# Patient Record
Sex: MALE | Race: WHITE | NOT HISPANIC OR LATINO | Employment: OTHER | URBAN - NONMETROPOLITAN AREA
[De-identification: names, ages, dates, MRNs, and addresses within clinical notes are randomized per-mention and may not be internally consistent; named-entity substitution may affect disease eponyms.]

---

## 2024-07-18 ENCOUNTER — OFFICE VISIT (OUTPATIENT)
Dept: NEUROSURGERY | Facility: CLINIC | Age: 84
End: 2024-07-18
Payer: MEDICARE

## 2024-07-18 VITALS — BODY MASS INDEX: 23.33 KG/M2 | HEIGHT: 74 IN | WEIGHT: 181.8 LBS

## 2024-07-18 DIAGNOSIS — M51.36 DDD (DEGENERATIVE DISC DISEASE), LUMBAR: Primary | ICD-10-CM

## 2024-07-18 DIAGNOSIS — F17.200 SMOKER: ICD-10-CM

## 2024-07-18 PROBLEM — M51.369 DDD (DEGENERATIVE DISC DISEASE), LUMBAR: Status: ACTIVE | Noted: 2024-07-18

## 2024-07-18 RX ORDER — MELATONIN
1000 DAILY
COMMUNITY

## 2024-07-18 RX ORDER — ACETAMINOPHEN 500 MG
500 TABLET ORAL EVERY 6 HOURS PRN
COMMUNITY

## 2024-07-18 RX ORDER — LORATADINE 10 MG/1
10 TABLET ORAL DAILY
COMMUNITY

## 2024-07-18 RX ORDER — SILDENAFIL 100 MG/1
100 TABLET, FILM COATED ORAL DAILY
COMMUNITY
Start: 2024-06-11

## 2024-07-18 RX ORDER — LOSARTAN POTASSIUM 25 MG/1
25 TABLET ORAL DAILY
COMMUNITY
Start: 2024-05-02

## 2024-07-18 RX ORDER — TADALAFIL 5 MG/1
1 TABLET ORAL DAILY
COMMUNITY
Start: 2024-05-25

## 2024-07-18 RX ORDER — GABAPENTIN 300 MG/1
300 CAPSULE ORAL NIGHTLY
Qty: 30 CAPSULE | Refills: 1 | Status: SHIPPED | OUTPATIENT
Start: 2024-07-18

## 2024-07-18 RX ORDER — CLOBETASOL PROPIONATE 0.5 MG/G
CREAM TOPICAL DAILY
COMMUNITY
Start: 2024-05-02

## 2024-07-18 RX ORDER — TAMSULOSIN HYDROCHLORIDE 0.4 MG/1
1 CAPSULE ORAL DAILY
COMMUNITY

## 2024-07-18 RX ORDER — GABAPENTIN 100 MG/1
100 CAPSULE ORAL 2 TIMES DAILY
Qty: 60 CAPSULE | Refills: 1 | Status: SHIPPED | OUTPATIENT
Start: 2024-07-18

## 2024-07-18 NOTE — PATIENT INSTRUCTIONS
Follow up with Dr Haider after PT  Call for sooner appointment if pain worsens, or if you develop and focal weakness  Gabapentin has been sent to the pharmacy

## 2024-07-18 NOTE — PROGRESS NOTES
Chief complaint:   Chief Complaint   Patient presents with    Back Pain     LUMBAR SPONDYLOSIS- pt states he is having lower back, and bilaterla hip pain, stated he has had PT in fall of 2023. Previously has seen Dr. Singh in the past for injections last one was over a year ago. Was seeing a provider at [facility] and discussed surgery.       Subjective     HPI: Too comes in today with his wife with complaints of chronic and worsening low back pain.  He has had issues for 10 to 12 years.  It is all axial back pain.  He has no leg pain.  His pain is most pronounced with flexion and when he rises from a sitting to standing position, he has a sharp pain with the first 2-3 steps.  He states that it takes him about 45 minutes to be able to ambulate in the morning due to stiffness.  He had a full workup earlier this year at [facility].  No surgery was recommended.  He denies any focal weakness as well as paresthesias.  He states he can stand up to an hour before he has to sit down.  He did pain management injections with Dr. Singh 3 to 4 years ago.  He had epidurals and SI joint injections.  Facet rhizotomies were recommended but after talking to his friends, he elected not to proceed with the procedure. He is currently utilizing Tylenol arthritis for pain.  He has been on gabapentin in the past but feels the dose was too high and it was making him loopy.  He indicates he would like to try it again.  He has not had any physical therapy recently.    Past medical history is significant for hypertension, BPH and seasonal allergies.    He is a retired .  He is .  He is a smoker.  He does not utilize drugs or alcohol.    Review of Systems   Constitutional:  Positive for activity change and fatigue.   Musculoskeletal:  Positive for back pain.        Past Medical History:   Diagnosis Date    Arthritis      History reviewed. No pertinent surgical history.  History reviewed. No  yes "pertinent family history.  Social History     Tobacco Use    Smoking status: Every Day     Types: Cigarettes     Passive exposure: Current    Smokeless tobacco: Never   Vaping Use    Vaping status: Never Used     (Not in a hospital admission)    Allergies:  Amoxicillin-pot clavulanate, Clavulanic acid, Doxycycline, and Gabapentin    Objective      Vital Signs  Ht 188 cm (74\")   Wt 82.5 kg (181 lb 12.8 oz)   BMI 23.34 kg/m²     Physical Exam  Constitutional:       Appearance: Normal appearance. He is well-developed.   HENT:      Head: Normocephalic.   Eyes:      General: Lids are normal.      Conjunctiva/sclera: Conjunctivae normal.      Pupils: Pupils are equal, round, and reactive to light.   Pulmonary:      Effort: Pulmonary effort is normal.      Breath sounds: Normal breath sounds.   Musculoskeletal:         General: Tenderness present.      Cervical back: Normal range of motion. Tenderness: There is pain with lumbar flexion extension and left greater than right lateral rotation..   Skin:     General: Skin is warm and dry.   Neurological:      Mental Status: He is alert and oriented to person, place, and time.      GCS: GCS eye subscore is 4. GCS verbal subscore is 5. GCS motor subscore is 6.      Cranial Nerves: No cranial nerve deficit.      Sensory: No sensory deficit.      Motor: Motor strength is normal.No weakness.      Coordination: Coordination normal.      Gait: Gait normal.      Deep Tendon Reflexes: Reflexes are normal and symmetric. Reflexes normal.      Reflex Scores:       Tricep reflexes are 2+ on the right side and 2+ on the left side.       Bicep reflexes are 2+ on the right side and 2+ on the left side.       Brachioradialis reflexes are 2+ on the right side and 2+ on the left side.       Patellar reflexes are 2+ on the right side and 2+ on the left side.       Achilles reflexes are 2+ on the right side and 2+ on the left side.  Psychiatric:         Speech: Speech normal.         Behavior: " Behavior normal.         Thought Content: Thought content normal.         Neurologic Exam     Mental Status   Oriented to person, place, and time.   Speech: speech is normal   Level of consciousness: alert  Knowledge: good.     Cranial Nerves     CN III, IV, VI   Pupils are equal, round, and reactive to light.    Motor Exam   Muscle bulk: normal  Overall muscle tone: normal    Strength   Strength 5/5 throughout. No EHL weakness     Sensory Exam   Light touch normal.     Gait, Coordination, and Reflexes     Reflexes   Right brachioradialis: 2+  Left brachioradialis: 2+  Right biceps: 2+  Left biceps: 2+  Right triceps: 2+  Left triceps: 2+  Right patellar: 2+  Left patellar: 2+  Right achilles: 2+  Left achilles: 2+  Right : 2+  Left : 2+Gait is steady       Imaging review: Full spine MRI that was completed at [facility] was reviewed by myself and Dr. Haider.  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  There is otherwise no high-grade central stenosis throughout the thoracic or lumbar spine.  There are Modic endplate changes throughout the lower thoracic region and greatest at L3-4 and L4-5.  There is moderate to severe disc degeneration L4-5.    AP, lateral and flexion-extension views of the lumbar spine demonstrate significant spondylosis L4-S1.  There is severe disc degeneration L4-5.  No anterior listhesis.  There are anterior osteophytes T11-T12.        Assessment/Plan: Too has axial back pain with spondylosis greatest L4-S1.  Disc degeneration is greatest at L4-5.  There is no instability.  He is neurologically stable and has no neurogenic claudication symptoms.    For first line conservative care of lumbar pain, I would like to send Mr. Jackson for a dedicated course of physician directed physical therapy consisting of 2-3 times a week for 4-6 weeks.    Return for reassessment with Dr. Haider after 6 to 8 weeks of physical therapy.    I recommend lumbar facet  sukhwindero me's L4-S1 to see if this helps with this pain.  He declines.  He is looking at a surgical fix before he gets any older and states that he wants something that will take care of the problem not just provide pain relief.    Per his request, I did send a prescription for gabapentin 100 mg twice daily and 300 mg at bedtime to assist with pain.  He can continue Tylenol arthritis as current.    I advised the patient to call and return sooner for new or worsening complaints of weakness, paresthesias, gait disturbances, or any additional concerns.  Treatment options discussed in detail with Too and the patient voiced understanding.  Mr. Jackson agrees with this plan of care.     Patient is a smoker. Smoking cessation classes given to the patient    The patient's Body mass index is 23.34 kg/m².. BMI is within normal parameters. No follow-up required.    ADVANCED CARE PLANNING  Information on advance directives provided in AVS.    AMIRA Fall Risk Assessment was completed, and patient is at LOW risk for falls.Assessment completed on:7/18/2024       Diagnoses and all orders for this visit:    1. DDD (degenerative disc disease), lumbar (Primary)  -     gabapentin (Neurontin) 100 MG capsule; Take 1 capsule by mouth 2 (Two) Times a Day.  Dispense: 60 capsule; Refill: 1  -     gabapentin (Neurontin) 300 MG capsule; Take 1 capsule by mouth Every Night.  Dispense: 30 capsule; Refill: 1  -     Ambulatory Referral to Physical Therapy for Evaluation & Treatment    2. Smoker    3. Body mass index (BMI) of 23.0-23.9 in adult    I spent 60 minutes caring for Too on this date of service. This time includes time spent by me in the following activities: preparing for the visit, reviewing tests, obtaining and/or reviewing a separately obtained history, performing a medically appropriate examination and/or evaluation, counseling and educating the patient/family/caregiver, ordering medications, tests, or procedures, referring and  communicating with other health care professionals, documenting information in the medical record, independently interpreting results and communicating that information with the patient/family/caregiver, and care coordination.        I discussed the patients findings and my recommendations with patient    Maria Alejandra Bell PA-C  07/18/24  16:07 CDT

## 2024-10-07 ENCOUNTER — OFFICE VISIT (OUTPATIENT)
Dept: NEUROSURGERY | Facility: CLINIC | Age: 84
End: 2024-10-07
Payer: MEDICARE

## 2024-10-07 VITALS — WEIGHT: 181 LBS | BODY MASS INDEX: 23.23 KG/M2 | HEIGHT: 74 IN

## 2024-10-07 DIAGNOSIS — M51.360 DEGENERATION OF INTERVERTEBRAL DISC OF LUMBAR REGION WITH DISCOGENIC BACK PAIN: ICD-10-CM

## 2024-10-07 DIAGNOSIS — F17.200 SMOKER: Primary | ICD-10-CM

## 2024-10-07 PROCEDURE — 99213 OFFICE O/P EST LOW 20 MIN: CPT | Performed by: NEUROLOGICAL SURGERY

## 2024-10-08 NOTE — PROGRESS NOTES
"Radiofrequency Ablations    Chief complaint:   Chief Complaint   Patient presents with    Follow-up     Follow up low back pain and bilateral hip pain        Subjective     HPI: I had a chance to see Too today in follow-up and to review his imaging studies of the lumbar spine.  He does have multilevel degenerative disc disease with facet arthropathy but I do not see any severe stenosis or instability that would benefit from surgical intervention however he might do really well with some facet injections with possible rhizotomies.    Review of Systems      Objective      Vital Signs  Ht 188 cm (74\")   Wt 82.1 kg (181 lb)   BMI 23.24 kg/m²     Physical Exam  Eyes:      General: Lids are normal.      Extraocular Movements: Extraocular movements intact.      Pupils: Pupils are equal, round, and reactive to light.   Psychiatric:         Speech: Speech normal.         Neurological Exam  Mental Status  Awake, alert and oriented to person, place and time. Speech is normal. Language is fluent with no aphasia. Attention and concentration are normal. Fund of knowledge is appropriate for level of education.    Cranial Nerves  CN II: Visual acuity is normal. Visual fields full to confrontation.  CN III, IV, VI: Extraocular movements intact bilaterally. Normal lids and orbits bilaterally. Pupils equal round and reactive to light bilaterally.  CN V: Facial sensation is normal.  CN VII: Full and symmetric facial movement.  CN IX, X: Palate elevates symmetrically. Normal gag reflex.  CN XI: Shoulder shrug strength is normal.  CN XII: Tongue midline without atrophy or fasciculations.    Motor  Normal muscle bulk throughout. Normal muscle tone. No abnormal involuntary movements.    Sensory  Light touch is normal in upper and lower extremities.     Gait  Casual gait is normal including stance, stride, and arm swing.       Imaging review:   MRI of the lumbar spine does show multilevel degenerative disc disease facet " arthropathy        Assessment/Plan:   Severe multilevel degenerative disc disease with no severe stenosis or instability    I do think it would be worthwhile trying some facet injections with possible rhizotomies as it is unlikely that surgery is going to provide any significant relief.  He also may be a good candidate for spinal cord stimulator if the facet rhizotomies do not work.  We will work on getting him set up for the rhizotomies and he will follow-up with me once they are complete.  I look forward to seeing him at his next visit.    Patient is a smoker  The patient's Body mass index is 23.24 kg/m².. BMI is above normal parameters. Recommendations include: continue with current weight loss program    Diagnoses and all orders for this visit:    1. Smoker (Primary)    2. Degeneration of intervertebral disc of lumbar region with discogenic back pain        I discussed the patients findings and my recommendations with patient  Ang Haider DO  10/22/24  13:14 CDT

## 2025-06-16 ENCOUNTER — OFFICE VISIT (OUTPATIENT)
Dept: NEUROSURGERY | Facility: CLINIC | Age: 85
End: 2025-06-16
Payer: MEDICARE

## 2025-06-16 VITALS — WEIGHT: 178 LBS | HEIGHT: 74 IN | BODY MASS INDEX: 22.84 KG/M2

## 2025-06-16 DIAGNOSIS — F17.200 SMOKER: ICD-10-CM

## 2025-06-16 DIAGNOSIS — M51.369 DEGENERATION OF INTERVERTEBRAL DISC OF LUMBAR REGION, UNSPECIFIED WHETHER PAIN PRESENT: ICD-10-CM

## 2025-06-16 DIAGNOSIS — M51.360 DEGENERATION OF INTERVERTEBRAL DISC OF LUMBAR REGION WITH DISCOGENIC BACK PAIN: Primary | ICD-10-CM

## 2025-06-16 NOTE — PROGRESS NOTES
"    Chief complaint:   Chief Complaint   Patient presents with    Wound Check     Follow up low back and bilateral hip pain        Subjective     HPI: I had a chance to see Too today in follow-up and to review his imaging studies.  Too is doing fairly well at this time and is still able to do many of his outdoor activities.  He does have what sounds like arthritic type pain particularly after getting up in the morning but gets better with motion.  I do think that we can continue with conservative management for now but I would like to see him back in 3 months to check on his progress.    Review of Systems      Objective      Vital Signs  Ht 188 cm (74\")   Wt 80.7 kg (178 lb)   BMI 22.85 kg/m²     Physical Exam  Eyes:      General: Lids are normal.      Extraocular Movements: Extraocular movements intact.   Neurological:      Motor: Motor strength is normal.  Psychiatric:         Speech: Speech normal.         Neurological Exam  Mental Status  Awake, alert and oriented to person, place and time. Oriented to person, place and time. Speech is normal. Language is fluent with no aphasia. Attention and concentration are normal. Fund of knowledge is appropriate for level of education.    Cranial Nerves  CN III, IV, VI: Extraocular movements intact bilaterally. Normal lids and orbits bilaterally.    Motor   No abnormal involuntary movements. Strength is 5/5 throughout all four extremities.    Gait  Casual gait is normal including stance, stride, and arm swing.       Imaging review:   No new imaging studies        Assessment/Plan:   Lumbar degenerative disc disease Too is doing quite well at this time and I think it is reasonable to continue with conservative management in light pain medication as long as he is able to continue with his activities.  I would like to see him back in 3 months to check on his progress.  I look forward to seeing him at his next visit.      Patient is a nonsmoker  The patient's Body mass " index is 22.85 kg/m².. BMI is within normal parameters. No follow-up required.    Diagnoses and all orders for this visit:    1. Degeneration of intervertebral disc of lumbar region with discogenic back pain (Primary)    2. Degeneration of intervertebral disc of lumbar region, unspecified whether pain present    3. Smoker        I discussed the patients findings and my recommendations with patient  Ang Haider,   06/16/25  13:33 CDT